# Patient Record
(demographics unavailable — no encounter records)

---

## 2025-02-06 NOTE — PHYSICAL EXAM
[General Appearance - Alert] : alert [General Appearance - In No Acute Distress] : in no acute distress [Oriented To Time, Place, And Person] : oriented to person, place, and time [Impaired Insight] : insight and judgment were intact [Affect] : the affect was normal [Person] : oriented to person [Place] : oriented to place [Time] : oriented to time [Short Term Intact] : short term memory intact [Remote Intact] : remote memory intact [Span Intact] : the attention span was normal [Concentration Intact] : normal concentrating ability [Fluency] : fluency intact [Comprehension] : comprehension intact [Current Events] : adequate knowledge of current events [Past History] : adequate knowledge of personal past history [Vocabulary] : adequate range of vocabulary [Cranial Nerves Optic (II)] : visual acuity intact bilaterally,  pupils equal round and reactive to light [Cranial Nerves Trigeminal (V)] : facial sensation intact symmetrically [Cranial Nerves Oculomotor (III)] : extraocular motion intact [Cranial Nerves Facial (VII)] : face symmetrical [Cranial Nerves Vestibulocochlear (VIII)] : hearing was intact bilaterally [Cranial Nerves Glossopharyngeal (IX)] : tongue and palate midline [Cranial Nerves Hypoglossal (XII)] : there was no tongue deviation with protrusion [Cranial Nerves Accessory (XI - Cranial And Spinal)] : head turning and shoulder shrug symmetric [Motor Tone] : muscle tone was normal in all four extremities [Motor Strength] : muscle strength was normal in all four extremities [No Muscle Atrophy] : normal bulk in all four extremities [Sensation Tactile Decrease] : light touch was intact [Past-pointing] : there was no past-pointing [Tremor] : no tremor present [2+] : Patella left 2+ [FreeTextEntry8] : ambulates with walker, non weight bearing left tib/fib fracture

## 2025-02-06 NOTE — REASON FOR VISIT
[Follow-Up: _____] : a [unfilled] follow-up visit [FreeTextEntry1] : Mr. De Paz is a  64 y/o male who presented after a fall sustaining a left tib/fib fracture, a L parafalcine subdural hemorrhage & and a scalp lac He was admitted to the trauma service.7/2024.  ED repaired scalp lac w/ staples. Neurosx consulted for SDH. Repeat CT head stable and pt w/o neuro deficits. Ortho consulted & recommended non-op mgmt and for pt to remain NWB to LLE in [place].   Interval History: Mr. De Paz is a 60 feet 6-year-old male that presents for evaluation of the lower back previously he was under our care for subdural hemorrhage.  At last he mentioned that he was having lower extremity numbness and tingling for which he was seen and evaluated by neurology with an EMG showing chronic lumbar radiculopathy right greater than left.  Today, planes of primarily numbness and tingling in the calf and bilateral feet.  He has not had any physical therapy.  He has a CAT scan of the lumbar spine that shows degenerative changes and a chronic L1 compression fracture but no MRI for evaluation.

## 2025-03-17 NOTE — DATA REVIEWED
[de-identified] : PROCEDURE DATE:  02/28/2025    INTERPRETATION:  CLINICAL INFORMATION: Low back pain  ADDITIONAL CLINICAL INFORMATION: Not Applicable  TECHNIQUE: Multiplanar, multisequence MRI was performed of the lumbar spine. IV Contrast: NONE  PRIOR STUDIES: Lumbar spine CT 7/17/2024  FINDINGS:  BONES: Mild multilevel endplate degenerative changes with marrow edema compatible with Modic changes. ALIGNMENT: Mild retrolisthesis of L1 on L2. SACROILIAC JOINTS/SACRUM: There is no sacral fracture. The SI joints are partially visualized but are intact. CONUS AND CAUDA EQUINA: The distal cord and conus are normal in signal. Conus terminates at L1. VISUALIZED INTRAPELVIC/INTRA-ABDOMINAL SOFT TISSUES: Atrophic left kidney. PARASPINAL SOFT TISSUES: Paraspinal muscle fatty infiltration with areas of atrophy.   INDIVIDUAL LEVELS: Severe multilevel loss of disc heights and disc signal.  LOWER THORACIC SPINE: Disc bulge at T12-L1 mildly narrowing the central canal and neural foramina.  L1-L2: Diffuse disc bulge with osseous ridging. Bilateral facet arthrosis. Moderate spinal canal stenosis. Moderate right and severe left foraminal narrowing. L2-L3: Diffuse disc bulge with osseous ridging with superimposed left paracentral protrusion. Narrowing of the left lateral recess. Mild bilateral facet arthrosis. Mild to moderate spinal canal stenosis. Moderate right and severe left foraminal narrowing. L3-L4: Diffuse disc bulge with osseous ridging. Mild spinal canal stenosis. Mild narrowing of the right lateral recess. Mild bilateral facet arthrosis. Moderate right greater than left foraminal narrowing. L4-L5: Mild disc bulge with osseous ridging. Mild bilateral facet arthrosis. Mild narrowing of the right lateral recess. Severe right greater than left foraminal narrowing. L5-S1: Disc bulge with bulky osseous ridging asymmetric to the right. Mild bilateral facet arthrosis. Narrowing of the right lateral recess with contact on the right S1 descending nerve. Mild to moderate right greater than left foraminal narrowing.   IMPRESSION:  Moderate to severe multilevel lumbar spondylosis with multilevel spinal canal and foraminal narrowing, notable at L1-L2.

## 2025-03-17 NOTE — PHYSICAL EXAM
[General Appearance - Alert] : alert [General Appearance - In No Acute Distress] : in no acute distress [Oriented To Time, Place, And Person] : oriented to person, place, and time [Impaired Insight] : insight and judgment were intact [Affect] : the affect was normal [Person] : oriented to person [Place] : oriented to place [Time] : oriented to time [Short Term Intact] : short term memory intact [Remote Intact] : remote memory intact [Span Intact] : the attention span was normal [Concentration Intact] : normal concentrating ability [Fluency] : fluency intact [Comprehension] : comprehension intact [Current Events] : adequate knowledge of current events [Past History] : adequate knowledge of personal past history [Vocabulary] : adequate range of vocabulary [Cranial Nerves Optic (II)] : visual acuity intact bilaterally,  pupils equal round and reactive to light [Cranial Nerves Oculomotor (III)] : extraocular motion intact [Cranial Nerves Trigeminal (V)] : facial sensation intact symmetrically [Cranial Nerves Facial (VII)] : face symmetrical [Cranial Nerves Vestibulocochlear (VIII)] : hearing was intact bilaterally [Cranial Nerves Glossopharyngeal (IX)] : tongue and palate midline [Cranial Nerves Accessory (XI - Cranial And Spinal)] : head turning and shoulder shrug symmetric [Cranial Nerves Hypoglossal (XII)] : there was no tongue deviation with protrusion [Motor Tone] : muscle tone was normal in all four extremities [Motor Strength] : muscle strength was normal in all four extremities [No Muscle Atrophy] : normal bulk in all four extremities [Sensation Tactile Decrease] : light touch was intact [Past-pointing] : there was no past-pointing [Tremor] : no tremor present [2+] : Patella left 2+ [FreeTextEntry8] : ambulates with walker, non weight bearing left tib/fib fracture

## 2025-03-17 NOTE — REASON FOR VISIT
[Follow-Up: _____] : a [unfilled] follow-up visit [FreeTextEntry1] : Mr. De Paz is a  66 y/o male who presented after a fall sustaining a left tib/fib fracture, a L parafalcine subdural hemorrhage & and a scalp lac He was admitted to the trauma service.7/2024.  ED repaired scalp lac w/ staples. Neurosx consulted for SDH. Repeat CT head stable and pt w/o neuro deficits. Ortho consulted & recommended non-op mgmt and for pt to remain NWB to LLE in .   Interval History:2/6/2025 Mr. De Paz is a 60 -year-old male that presents for evaluation of the lower back previously he was under our care for subdural hemorrhage.  At last he mentioned that he was having lower extremity numbness and tingling for which he was seen and evaluated by neurology with an EMG showing chronic lumbar radiculopathy right greater than left.  Today, planes of primarily numbness and tingling in the calf and bilateral feet.  He has not had any physical therapy.  He has a CAT scan of the lumbar spine that shows degenerative changes and a chronic L1 compression fracture but no MRI for evaluation.  Interval History:  Mr. De Paz is a 66 year old male presents for review of MRI lumbar spine for complaints of RLE pain.  He is currently involved in physical therapy and tolerating well.  He is taking gabapentin as directed which helps his nerve pain.

## 2025-03-17 NOTE — ASSESSMENT
[FreeTextEntry1] : Mr. De Paz is a 66 year old male who presents for neurosurgical follow up and review of MRI lumbar spine for complaints of RLE pain. MRI lumbar spine shows L1-L2: Diffuse disc bulge with osseous ridging. Bilateral facet arthrosis. Moderate spinal canal stenosis. Moderate right and severe left foraminal narrowing. L2-L3: Diffuse disc bulge with osseous ridging with superimposed left paracentral protrusion. Narrowing of the left lateral recess. Mild bilateral facet arthrosis. Mild to moderate spinal canal stenosis. Moderate right and severe left foraminal narrowing. L3-L4: Diffuse disc bulge with osseous ridging. Mild spinal canal stenosis. Mild narrowing of the right lateral recess. Mild bilateral facet arthrosis. Moderate right greater than left foraminal narrowing. L4-L5: Mild disc bulge with osseous ridging. Mild bilateral facet arthrosis. Mild narrowing of the right lateral recess. Severe right greater than left foraminal narrowing. L5-S1: Disc bulge with bulky osseous ridging asymmetric to the right. Mild bilateral facet arthrosis. Narrowing of the right lateral recess with contact on the right S1 descending nerve. Mild to moderate right greater than left foraminal narrowing.  He is involved in physical therapy and tolerating well.  Gabapentin 300 mg TID is helpful as well   Plan: Continue PT Continue gabapentin 300 mg TID f/u  6 weeks Patient has been given an opportunity to ask and have their questions answered to their satisfaction. Patient knows to call the office if there are any new or worsening symptoms.    I, Dr. Cyrus Pressley, personally performed the evaluation and management (E/M) services for this established patient who presents today. That E/M includes conducting the clinically appropriate interval history &/or exam and establishing a continued plan of care. Today, my KRATNHI, Laura Sterling, was here to observe my evaluation and management service for this patient and follow the plan of care established by me going forward.

## 2025-06-27 NOTE — PHYSICAL EXAM
[Well Developed] : well developed [Well Nourished] : well nourished [No Acute Distress] : no acute distress [Normal Conjunctiva] : normal conjunctiva [Normal Venous Pressure] : normal venous pressure [No Carotid Bruit] : no carotid bruit [Normal S1, S2] : normal S1, S2 [No Murmur] : no murmur [No Rub] : no rub [No Gallop] : no gallop [Clear Lung Fields] : clear lung fields [No Respiratory Distress] : no respiratory distress  [Good Air Entry] : good air entry [Soft] : abdomen soft [Non Tender] : non-tender [No Masses/organomegaly] : no masses/organomegaly [Normal Bowel Sounds] : normal bowel sounds [Normal Gait] : normal gait [No Edema] : no edema [No Cyanosis] : no cyanosis [No Clubbing] : no clubbing [No Varicosities] : no varicosities [No Rash] : no rash [No Skin Lesions] : no skin lesions [Moves all extremities] : moves all extremities [No Focal Deficits] : no focal deficits [Alert and Oriented] : alert and oriented [Normal Speech] : normal speech [Normal memory] : normal memory
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
## 2025-07-01 NOTE — REVIEW OF SYSTEMS
[SOB] : shortness of breath [Dyspnea on exertion] : dyspnea during exertion [Negative] : Heme/Lymph [Chest Discomfort] : no chest discomfort [Lower Ext Edema] : no extremity edema [Leg Claudication] : no intermittent leg claudication [Syncope] : no syncope